# Patient Record
Sex: FEMALE | Race: WHITE | NOT HISPANIC OR LATINO
[De-identification: names, ages, dates, MRNs, and addresses within clinical notes are randomized per-mention and may not be internally consistent; named-entity substitution may affect disease eponyms.]

---

## 2020-07-29 ENCOUNTER — APPOINTMENT (OUTPATIENT)
Dept: OTOLARYNGOLOGY | Facility: CLINIC | Age: 20
End: 2020-07-29
Payer: COMMERCIAL

## 2020-07-29 VITALS — WEIGHT: 120 LBS | HEIGHT: 66 IN | BODY MASS INDEX: 19.29 KG/M2

## 2020-07-29 PROCEDURE — 99203 OFFICE O/P NEW LOW 30 MIN: CPT

## 2020-07-29 NOTE — HISTORY OF PRESENT ILLNESS
[de-identified] : Patient presents today with c/o bump on the back of her neck. Patient states she noticed it about 1 week ago. No pain associated. No change in size. No recent URI. Patient is a non smoker. No weight loss. No other concerning lumps or bumps. Pt has adjacent pimple to the mass.  [Difficulty Swallowing] : no difficulty swallowing [Neck Mass] : neck mass [Painful Swallowing] : no painful swallowing [Fatigue] : no fatigue [Neck Pain] : no neck pain [Hoarseness] : no hoarseness [Night Sweats] : no night sweats [Shortness of Breath] : no shortness of breath

## 2020-07-29 NOTE — PHYSICAL EXAM
[Midline] : trachea located in midline position [Normal] : no rashes [de-identified] : left posterior cervical mass, NT firm, slightly mobile

## 2020-11-25 ENCOUNTER — APPOINTMENT (OUTPATIENT)
Dept: OTOLARYNGOLOGY | Facility: CLINIC | Age: 20
End: 2020-11-25
Payer: COMMERCIAL

## 2020-11-25 VITALS — TEMPERATURE: 98.7 F

## 2020-11-25 PROCEDURE — 31575 DIAGNOSTIC LARYNGOSCOPY: CPT

## 2020-11-25 PROCEDURE — 99212 OFFICE O/P EST SF 10 MIN: CPT | Mod: 25

## 2020-11-25 NOTE — PROCEDURE
[Complicated Symptoms] : complicated symptoms requiring more thorough examination than provided by mirror [Normal] : posterior cricoid area had healthy pink mucosa in the interarytenoid area and the esophageal inlet

## 2020-11-25 NOTE — PHYSICAL EXAM
[Midline] : trachea located in midline position [Normal] : no rashes [de-identified] : posterior ri LAD WNL, right level 2

## 2020-11-25 NOTE — HISTORY OF PRESENT ILLNESS
[FreeTextEntry1] : Patient presents today following up on cervical lymphadenopathy. Patient notices a new lump about 1 month ago. They are hard. Some dull pain when touching. No dysphagia. No choking.

## 2020-11-28 ENCOUNTER — OUTPATIENT (OUTPATIENT)
Dept: OUTPATIENT SERVICES | Facility: HOSPITAL | Age: 20
LOS: 1 days | Discharge: HOME | End: 2020-11-28
Payer: COMMERCIAL

## 2020-11-28 DIAGNOSIS — R59.0 LOCALIZED ENLARGED LYMPH NODES: ICD-10-CM

## 2020-11-28 PROCEDURE — 76536 US EXAM OF HEAD AND NECK: CPT | Mod: 26

## 2020-12-09 ENCOUNTER — APPOINTMENT (OUTPATIENT)
Dept: OTOLARYNGOLOGY | Facility: CLINIC | Age: 20
End: 2020-12-09
Payer: COMMERCIAL

## 2020-12-09 DIAGNOSIS — R22.1 LOCALIZED SWELLING, MASS AND LUMP, NECK: ICD-10-CM

## 2020-12-09 PROCEDURE — 99072 ADDL SUPL MATRL&STAF TM PHE: CPT

## 2020-12-09 PROCEDURE — 99213 OFFICE O/P EST LOW 20 MIN: CPT

## 2020-12-09 NOTE — DATA REVIEWED
[de-identified] : \par \par EXAM: US HEAD NECK SOFT TISSUE\par \par \par PROCEDURE DATE: 11/28/2020\par \par \par \par \par INTERPRETATION: Clinical History / Reason for exam: Soft tissue mass\par Ultrasound of the areas indicated by the patient to ultrasonographer miguelina was performed.\par No comparison exam.\par In the posterior right side of the neck in the area of clinical palpable abnormality there is seen a 1 cm x 0.3 cm x 1 cm well-defined solid nodule having an echogenic architecture consistent with lymph node formation.\par In the left side of the neck in the area of clinical palpable abnormality there is seen a solid 3.3 cm x 0.9 cm x 1.7 cm nodule contiguous to the left submandibular gland with ultrasound architecture consistent with lymph node formation and presumably representing a inflamed/reactive lymph node. No abscess formation seen Follow-up exam is suggested to confirm this impression.\par Impression\par 1 cm lymph node posterior neck\par 3 cm enlarged lymph node left lateral neck. Follow-up exam suggested\par \par \par \par \par ALENA VERA MD; Attending Radiologist\par This document has been electronically signed. Nov 28 2020 1

## 2020-12-09 NOTE — PHYSICAL EXAM
[Midline] : trachea located in midline position [Normal] : no rashes [de-identified] : posterior ri LAD WNL, right level 2

## 2020-12-09 NOTE — REASON FOR VISIT
[Subsequent Evaluation] : a subsequent evaluation for [FreeTextEntry2] : neck swelling, cervical lymphadenopathy

## 2020-12-09 NOTE — HISTORY OF PRESENT ILLNESS
[FreeTextEntry1] : Patient here following up on neck swelling, cervical lymphadenopathy. Patient had neck sonogram performed. No new changes. No new lumps. Pt has no other related symptoms.  [Fever] : no fever [Cough] : no cough [Fatigue] : no fatigue [Night Sweats] : no night sweats [Shortness of Breath] : no shortness of breath [Weight Loss] : no weight loss

## 2021-06-10 ENCOUNTER — APPOINTMENT (OUTPATIENT)
Dept: OTOLARYNGOLOGY | Facility: CLINIC | Age: 21
End: 2021-06-10
Payer: COMMERCIAL

## 2021-06-10 DIAGNOSIS — R22.1 LOCALIZED SWELLING, MASS AND LUMP, NECK: ICD-10-CM

## 2021-06-10 DIAGNOSIS — R59.0 LOCALIZED ENLARGED LYMPH NODES: ICD-10-CM

## 2021-06-10 PROCEDURE — 99213 OFFICE O/P EST LOW 20 MIN: CPT

## 2021-06-10 NOTE — HISTORY OF PRESENT ILLNESS
[FreeTextEntry1] : Patient presents today c/o cervical lymphadenopathy , neck mass .  Neck mass has diminished, no new issues. Lymoh nodes still persist though.  [Fever] : no fever [Fatigue] : no fatigue [Night Sweats] : no night sweats [Shortness of Breath] : no shortness of breath [Weight Loss] : no weight loss

## 2021-06-10 NOTE — REASON FOR VISIT
[Subsequent Evaluation] : a subsequent evaluation for [FreeTextEntry2] : cervical lymphadenopathy , neck mass

## 2021-06-10 NOTE — DATA REVIEWED
[de-identified] : \par EXAM: US HEAD NECK SOFT TISSUE\par \par \par PROCEDURE DATE: 11/28/2020\par \par \par \par \par INTERPRETATION: Clinical History / Reason for exam: Soft tissue mass\par Ultrasound of the areas indicated by the patient to ultrasonographer miguelina was performed.\par No comparison exam.\par In the posterior right side of the neck in the area of clinical palpable abnormality there is seen a 1 cm x 0.3 cm x 1 cm well-defined solid nodule having an echogenic architecture consistent with lymph node formation.\par In the left side of the neck in the area of clinical palpable abnormality there is seen a solid 3.3 cm x 0.9 cm x 1.7 cm nodule contiguous to the left submandibular gland with ultrasound architecture consistent with lymph node formation and presumably representing a inflamed/reactive lymph node. No abscess formation seen Follow-up exam is suggested to confirm this impression.\par Impression\par 1 cm lymph node posterior neck\par 3 cm enlarged lymph node left lateral neck. Follow-up exam suggested\par \par \par \par \par ALENA VERA MD; Attending Radiologist\par This document has been electronically signed. Nov 28 2020 1:40PM

## 2024-01-22 ENCOUNTER — APPOINTMENT (OUTPATIENT)
Dept: ORTHOPEDIC SURGERY | Facility: CLINIC | Age: 24
End: 2024-01-22
Payer: COMMERCIAL

## 2024-01-22 VITALS — WEIGHT: 135 LBS | HEIGHT: 65 IN | BODY MASS INDEX: 22.49 KG/M2

## 2024-01-22 PROCEDURE — 99203 OFFICE O/P NEW LOW 30 MIN: CPT

## 2024-01-22 PROCEDURE — 73030 X-RAY EXAM OF SHOULDER: CPT | Mod: RT

## 2024-01-22 RX ORDER — MELOXICAM 15 MG/1
15 TABLET ORAL
Qty: 14 | Refills: 0 | Status: ACTIVE | COMMUNITY
Start: 2024-01-22 | End: 1900-01-01

## 2024-01-22 NOTE — HISTORY OF PRESENT ILLNESS
[de-identified] : The patient is a 23-year-old female, right-hand-dominant, who is here today for evaluation of right chest pain over her pectoralis.  The pain started around 1/8/2024 when she was in the gym stretching.  She reports pain over the right anterior chest over her pectoralis muscle.  She did not have much pain until this past week when she noted pain when reaching for and lifting boxes at work.  She also had some pain overnight.  She rates the pain as 2 out of 10 at rest and 5 out of 10 with activity.  She has not had any treatment for this.  She is not taking any pain medications.  She denies any pop when the pain started.  She denies any swelling or ecchymosis.  She tried a heat pack but felt that this made it worse.  Past medical history: None  Past surgical history: None  Allergies: None  Current medications: None  Family history: None  Social history: Single Occasional alcohol use Denies tobacco use Denies recreational drug use  Review of systems: A 10-point review of systems was unremarkable as noted on the new patient questionnaire dated 1/22/2024

## 2024-01-22 NOTE — PHYSICAL EXAM
[de-identified] : The patient is well-appearing.  Examination of her right shoulder and chest demonstrates intact skin.  No asymmetry.  No swelling.  No ecchymosis.  No tenderness to palpation over the bicipital groove, greater tuberosity, acromion, or AC joint.  Mild discomfort with palpation over the pectoralis muscle.  Full shoulder range of motion in forward elevation, abduction, internal and external rotation.  5 out of 5 rotator cuff strength.  Mild discomfort with shoulder adduction.

## 2024-01-22 NOTE — DATA REVIEWED
[TextEntry] : Right shoulder x-rays taken today were personally reviewed, demonstrating no acute fracture.  Maintained joint spaces.  No significant degenerative changes.

## 2024-01-22 NOTE — ASSESSMENT
[FreeTextEntry1] : Assessment: 1.  Right pectoralis muscle strain  Plan: 1.  Clinical and radiographic findings reviewed with the patient 2.  I discussed treatment options with her including NSAIDs and physical therapy.  I sent a prescription for short course of meloxicam to her pharmacy.  NSAID precautions were provided.  I also provided her with a referral for physical therapy. 3.  I will see her back for follow-up in 3 to 4 weeks for repeat evaluation.  Should her symptoms not improve at that point, we will consider getting an MRI for further evaluation. 4.  Plan of care was reviewed with the patient and she is amenable.  All questions were answered.

## 2024-02-21 ENCOUNTER — APPOINTMENT (OUTPATIENT)
Dept: ORTHOPEDIC SURGERY | Facility: CLINIC | Age: 24
End: 2024-02-21
Payer: COMMERCIAL

## 2024-02-21 DIAGNOSIS — S29.011A STRAIN OF MUSCLE AND TENDON OF FRONT WALL OF THORAX, INITIAL ENCOUNTER: ICD-10-CM

## 2024-02-21 PROCEDURE — 99212 OFFICE O/P EST SF 10 MIN: CPT

## 2024-02-21 NOTE — ASSESSMENT
[FreeTextEntry1] : Assessment: Right pectoralis muscle strain  Plan: 1.  Clinical findings were reviewed with the patient.  Overall she has no pain for the most part except for 3 times over the past month when she experienced pain that resolved completely within 1 to 2 days spontaneously.  I discussed options for next steps with her.  Given that she has no pain for the majority of the time and is able to go to the gym without any issues and no pain, we will continue to monitor this conservatively. 2.  I would like to see her back for follow-up in about 3 to 4 weeks for repeat evaluation.  Again we discussed that should her symptoms not improve or worsen at that point, we can get an MRI for further evaluation. 3.  Plan of care was reviewed with the patient and she is in agreement.  All questions were answered to her satisfaction.

## 2024-02-21 NOTE — HISTORY OF PRESENT ILLNESS
[de-identified] : The patient is a 23-year-old female who is here today for follow-up of a right pectoralis strain.  I last saw her on 1/22/2024.  She for started having pain around 1/8/2024 when she is in the gym stretching.  She has been resting and overall doing very well.  Except for 3 instances over the past 4 weeks when she either reached out to grab something or she "twitched", she has not had pain.  During those 3 episodes, she had recurrence of pain that resolved within 1 to 2 days.  She has been able to go to the gym and does not have pain when she goes to the gym.  She does not have radiating pain.  She does not have shortness of breath.  She does not have numbness or tingling.   Physical exam: The patient is well-appearing.  Examination of her right shoulder and chest demonstrates intact skin.  No asymmetry.  No swelling and no ecchymosis.  Minimal discomfort with palpation over the right pectoralis muscle.  Full shoulder range of motion with no pain.  Excellent rotator cuff strength.  No new imaging obtained today.

## 2024-03-13 ENCOUNTER — APPOINTMENT (OUTPATIENT)
Dept: ORTHOPEDIC SURGERY | Facility: CLINIC | Age: 24
End: 2024-03-13